# Patient Record
(demographics unavailable — no encounter records)

---

## 2024-10-17 NOTE — HISTORY OF PRESENT ILLNESS
[FreeTextEntry6] : 13 year old male presents to clinic for encounter for immunizations. [place]rived [place] the [place] [facility] in 4/2024. Childhood vaccine record reflects immunizations at various intervals but no lot is identified and no signature of person administering the vaccines. He denies any adverse reaction to vaccines in the past. He feels well today.

## 2024-10-17 NOTE — RISK ASSESSMENT
[Grade: ____] : Grade: [unfilled] [Eats meals with family] : eats meals with family [Has family members/adults to turn to for help] : has family members/adults to turn to for help [Normal Performance] : normal performance [Eats regular meals including adequate fruits and vegetables] : eats regular meals including adequate fruits and vegetables [Drinks non-sweetened liquids] : drinks non-sweetened liquids  [Calcium source] : calcium source [Has friends] : has friends [Screen time (except homework) less than 2 hours a day] : screen time (except homework) less than 2 hours a day [Has interests/participates in community activities/volunteers] : has interests/participates in community activities/volunteers [Home is free of violence] : home is free of violence [Has peer relationships free of violence] : has peer relationships free of violence [Has ways to cope with stress] : has ways to cope with stress [With Teen] : teen [Is permitted and is able to make independent decisions] : Is not permitted and is not able to make independent decisions [Has concerns about body or appearance] : does not have concerns about body or appearance [At least 1 hour of physical activity a day] : does not do at least 1 hour of physical activity a day [Uses tobacco] : does not use tobacco [Uses drugs] : does not use drugs  [Drinks alcohol] : does not drink alcohol [Uses safety belts/safety equipment] : does not use safety belts/safety equipment  [Impaired/distracted driving] : no impaired/distracted driving [Has/had oral sex] : has not had oral sex [Has had sexual intercourse] : has not had sexual intercourse [Displays self-confidence] : does not display self-confidence [Has problems with sleep] : does not have problems with sleep [Gets depressed, anxious, or irritable/has mood swings] : does not get depressed, anxious, or irritable/has mood swings [Has thought about hurting self or considered suicide] : has not thought about hurting self or considered suicide [de-identified] : Lives with his parents [de-identified] : Multicultural HS [de-identified] : enjoys going to the park to play soccer [de-identified] : Identifies as male; Interested in females only; Not currently in a relationship. [de-identified] : Distracts himself using his phone

## 2024-10-17 NOTE — DISCUSSION/SUMMARY
[] : The components of the vaccine(s) to be administered today are listed in the plan of care. The disease(s) for which the vaccine(s) are intended to prevent and the risks have been discussed with the caretaker.  The risks are also included in the appropriate vaccination information statements which have been provided to the patient's caregiver.  The caregiver has given consent to vaccinate. [FreeTextEntry1] : 13 year old male presents to clinic for encounter for immunizations. 1. Immunizations -Writer concerned that immunization record brought in by patient is missing necessary information to deem patient immune to childhood illnesses. -The following titers were drawn today to determine patient's immunity: IgG MMRV and Hepatitis B surface antibody. Will follow-up lab results. -Advised patient to apply cool compress or ice pack to arm if having pain, and use of OTC fever reducing agents such as Tylenol or Ibuprofen if he develops fever. -Updated copy of vaccine record provided to patient.  2.  -[facility] performed and reviewed with patient. No positive indicators noted. Anticipatory guidance provided.  Pt will RTC on 11/4/24 to continue vaccine schedule.

## 2024-10-17 NOTE — BEGINNING OF VISIT
[Patient] : patient [] :  [[employer] ] : provided by [employer]   [Time Spent: ____ minutes] : Total time spent using  services: [unfilled] minutes. The patient's primary language is not English thus required  services. [Interpreters_IDNumber] : [id number] [Interpreters_FullName] : Sharon [[email or web address]] : Serbian

## 2024-11-04 NOTE — HISTORY OF PRESENT ILLNESS
[Varicella] : Varicella [FreeTextEntry1] : 13 year old male presents to clinic today for vaccine administration. Labs reviewed from 10/17/2024. Pt has immunity against Hepatitis B and MMR. CIR updated. CIR reviewed, pt due for Varicella today. Consent signed by mother on chart. Pt denies any adverse reactions to vaccines in the past. Pt feels well, denies any s/s of illness at present.

## 2024-11-04 NOTE — DISCUSSION/SUMMARY
[FreeTextEntry1] : 13 year old male presents to clinic for vaccine administration. -The following vaccines were administered without difficulty: Varicella. -Advised patient to apply cool compress or ice pack to arm if having pain, and use of OTC fever reducing agents such as Tylenol or Ibuprofen if he develops fever. -Updated copy of CIR provided to patient.  Pt will RTC on 11/14/2024 to continue vaccine schedule. [] : The components of the vaccine(s) to be administered today are listed in the plan of care. The disease(s) for which the vaccine(s) are intended to prevent and the risks have been discussed with the caretaker.  The risks are also included in the appropriate vaccination information statements which have been provided to the patient's caregiver.  The caregiver has given consent to vaccinate.

## 2024-11-06 NOTE — BEGINNING OF VISIT
[Patient] : patient [] :  [[employer] ] : provided by [employer]   [Time Spent: ____ minutes] : Total time spent using  services: [unfilled] minutes. The patient's primary language is not English thus required  services. [Interpreters_IDNumber] : [id number] [Interpreters_FullName] : Efren [[email or web address]] : Egyptian

## 2024-11-06 NOTE — DISCUSSION/SUMMARY
[FreeTextEntry1] : 13 year old male presents to clinic for abdominal pain calcium carbonate 500mg tablets x 2 dispensed now Based on HPI and physical exam likely patient pain is related to contaminated or spoiled food. Discussed with patient to eat smaller meals, eat slowly; avoid fast food, fried food, and fatty foods. Encouraged a bland diet until symptoms resolve. Encouraged patient to increase fluid intake and drink ginger-brigida to alleviate indigestion.   Return to clinic for new or worsening symptoms

## 2024-11-06 NOTE — PHYSICAL EXAM
[NL] : no acute distress, alert [Soft] : soft [Tender] : tender [Distended] : nondistended [Normal Bowel Sounds] : abnormal bowel sounds [FreeTextEntry9] : hyperactive bowel sounds x 4 quadrants, with most tenderness palpated around umbilicus

## 2024-11-06 NOTE — REVIEW OF SYSTEMS
[PO Intolerance] : PO tolerance [Vomiting] : no vomiting [Diarrhea] : no diarrhea [Constipation] : no constipation [Gaseous] : gaseous [Abdominal Pain] : abdominal pain [Negative] : Constitutional

## 2024-11-06 NOTE — HISTORY OF PRESENT ILLNESS
[FreeTextEntry6] : 13 year old male presents to clinic with c/o stomachache states it started in class, began right after lunch feels like gas ate lunch - milk, chicken and potatoes last BM this morning - normal stool c/o generalized pain

## 2025-02-25 NOTE — HISTORY OF PRESENT ILLNESS
[FreeTextEntry6] : 13 year old male presents to [facility] for stomach ache Pain onset 1 hour ago, pain at present 5/10 Location above the umbilical region He ate rice this morning He denies n/v and diarrhea Last bowel movement: today, formed, easy to pass

## 2025-02-25 NOTE — DISCUSSION/SUMMARY
[FreeTextEntry1] : 13 year old male presents to [facility] for stomach ache -VSS and physical exam WNL -Provided patient with Pepto-bismol for symptom relief -Recommended for patient to avoid greasy, fatty, spicy foods until symptoms have resolved; recommended bland/brat diet until symptoms resolve completely  RTC for new or worsening symptoms.